# Patient Record
(demographics unavailable — no encounter records)

---

## 2025-03-07 NOTE — HISTORY OF PRESENT ILLNESS
[FreeTextEntry1] : New penile lesion on self examination that increasing in size and discomfort on palpation, saw in [facility], had negative cysto.  Please refer to U[facility] Consult Note.
MEDICINE

## 2025-03-07 NOTE — ADDENDUM
[FreeTextEntry1] : Entered by Rohit Wills, acting as scribe for Dr. Frank Alba. The documentation recorded by the scribe accurately reflects the service I personally performed and the decisions made by me.

## 2025-03-07 NOTE — LETTER BODY
[FreeTextEntry1] : Keith Otto MD [address] [place] [id number] [phone]  Dear Dr. Otto,  Reason for Visit: Abnormal self-examination. Penile lesion.     This is a 60 year-old gentleman with penile lesion. Patient is referred for evaluation of his condition. Patient reports of penile lesion located on the inside of the phallus. Patient previously was seen at [facility] for evaluation. However, his cystoscopy was negative. He complains of pain with erection. Patient denies any gross hematuria or dysuria or urinary incontinence. The patient denies any aggravating or relieving factors. The patient denies any interference of function.  All other review of systems are negative. He has no cancer in his family medical history. He has no previous surgical history. Past medical history, family history and social history were inquired and were noncontributory to current condition. The patient does not use tobacco or drink alcohol. Medications and allergies were reviewed. He has no known allergies to medication.     On examination, the patient is a healthy-appearing gentleman in no acute distress. He is alert and oriented follows commands. He has normal mood and affect. He is normocephalic. Oral no thrush. Neck is supple. Respirations are unlabored. His abdomen is soft and nontender. Liver is nonpalpable. Bladder is nonpalpable. No CVA tenderness. Neurologically he is grossly intact. No peripheral edema. Skin without gross abnormality. He has normal male external genitalia. Normal meatus. Bilateral testes are descended intrascrotally and normal to palpation. 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    Physical examination today was chaperoned by Anuel Miller RN (Jessie)  Assessment: Abnormal self-examination. Penile lesion. Peyronie's plaque.   I counseled the patient. I discussed the various etiologies of his symptoms. Patient has a Peyronie's plaque on the dorsal aspect of the phallus. This is likely benign. However, patient complains of pain with erections. Given his moderate symptoms, I recommended he obtain a penile MRI for further evaluation. I counseled the patient regarding the procedure. The risks and benefits were discussed. Alternatives were given. I answered the patient's questions. The patient will take the necessary preparations for the procedure. I also recommended the patient obtain an STD panel with Chlamydia/GC amplification, hepatitis acute panel, herpes simplex (1,2) IgG, HIV AG/AB screen, QuantiFERON plus TB, syphilis, Ureaplasma/mycoplasma genital culture. He will also obtain PSA, BMP, and urinalysis today to establish baseline. The patient will follow-up as directed and will contact me with any questions or concerns. Thank you for the opportunity to participate in the care of this patient. I'll keep you updated on his progress.     Plan: Penile MRI. STD panel. PSA. BMP. Urinalysis. Follow up as directed.